# Patient Record
Sex: MALE | Race: WHITE | NOT HISPANIC OR LATINO | RURAL
[De-identification: names, ages, dates, MRNs, and addresses within clinical notes are randomized per-mention and may not be internally consistent; named-entity substitution may affect disease eponyms.]

---

## 2022-03-23 NOTE — PATIENT DISCUSSION
Revised spec rx.  Patient agreed that revised rx is improved and certainly improved on the chart with the change.

## 2022-12-12 ENCOUNTER — NEW PATIENT (OUTPATIENT)
Dept: RURAL CLINIC 1 | Facility: CLINIC | Age: 62
End: 2022-12-12

## 2022-12-12 PROCEDURE — 92015 DETERMINE REFRACTIVE STATE: CPT

## 2022-12-12 PROCEDURE — 92004 COMPRE OPH EXAM NEW PT 1/>: CPT

## 2022-12-12 ASSESSMENT — TONOMETRY
OS_IOP_MMHG: 16
OD_IOP_MMHG: 16

## 2022-12-12 ASSESSMENT — VISUAL ACUITY
OU_CC: 20/25
OD_CC: 20/30-1
OS_CC: 20/25
OU_CC: 20/25